# Patient Record
Sex: FEMALE | Race: WHITE
[De-identification: names, ages, dates, MRNs, and addresses within clinical notes are randomized per-mention and may not be internally consistent; named-entity substitution may affect disease eponyms.]

---

## 2020-08-21 ENCOUNTER — HOSPITAL ENCOUNTER (EMERGENCY)
Dept: HOSPITAL 11 - JP.ED | Age: 45
Discharge: HOME | End: 2020-08-21
Payer: COMMERCIAL

## 2020-08-21 DIAGNOSIS — Z90.49: ICD-10-CM

## 2020-08-21 DIAGNOSIS — S06.0X9A: Primary | ICD-10-CM

## 2020-08-21 DIAGNOSIS — Z90.89: ICD-10-CM
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
## 2020-08-21 DIAGNOSIS — I10: ICD-10-CM

## 2020-08-21 DIAGNOSIS — Z79.899: ICD-10-CM

## 2020-08-21 NOTE — CRLCT
INDICATION: fall, struck right side of head, takes lovenox



CT HEAD WITHOUT CONTRAST



TECHNIQUE:  Multiple axial CT images were performed through the head 

without intravenous contrast administration.



COMPARISON:  No previous studies are currently available for comparison.



FINDINGS:  No acute intracranial hemorrhage is identified.  No extra-axial 

collections are evident and there is no mass effect or midline shift.  

Ventricles are normal in size and configuration. There are multiple small 

bilateral cerebral hemisphere chronic infarcts, including infarcts in the 

bilateral frontal lobes, parasagittal right frontoparietal region, right 

temporoparietal area, and left occipital lobe. Osseous structures are 

within normal limits and no fractures are seen.  Included portions of the 

paranasal sinuses and mastoid air cells are normally aerated except for a 

mucous retention cyst or polyp in the posterior right sphenoid sinus, mild 

concentric mucosal thickening in the left sphenoid sinus, and a trace left 

mastoid effusion.



IMPRESSION:   



1. No acute intracranial abnormality identified. No fracture is seen.



2. 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 

noted above.



3. Paranasal sinus disease and trace left mastoid effusion. 



ELVIA ALAN MD



[facility]



Dictated by Lee Alan MD @ 8/21/2020 9:17:51 PM



Dictated by: Lee Alan MD @ 08/21/2020 21:18:11



(Electronically Signed)